# Patient Record
Sex: FEMALE | Race: WHITE | URBAN - METROPOLITAN AREA
[De-identification: names, ages, dates, MRNs, and addresses within clinical notes are randomized per-mention and may not be internally consistent; named-entity substitution may affect disease eponyms.]

---

## 2017-03-03 ENCOUNTER — OUTPATIENT (OUTPATIENT)
Dept: OUTPATIENT SERVICES | Facility: HOSPITAL | Age: 77
LOS: 1 days | End: 2017-03-03

## 2017-03-04 PROBLEM — Z00.00 ENCOUNTER FOR PREVENTIVE HEALTH EXAMINATION: Status: ACTIVE | Noted: 2017-03-04

## 2017-07-27 ENCOUNTER — OUTPATIENT (OUTPATIENT)
Dept: OUTPATIENT SERVICES | Facility: HOSPITAL | Age: 77
LOS: 1 days | End: 2017-07-27

## 2017-09-21 ENCOUNTER — OUTPATIENT (OUTPATIENT)
Dept: OUTPATIENT SERVICES | Facility: HOSPITAL | Age: 77
LOS: 1 days | End: 2017-09-21

## 2018-01-09 ENCOUNTER — OUTPATIENT (OUTPATIENT)
Dept: OUTPATIENT SERVICES | Facility: HOSPITAL | Age: 78
LOS: 1 days | End: 2018-01-09

## 2018-01-24 ENCOUNTER — OUTPATIENT (OUTPATIENT)
Dept: OUTPATIENT SERVICES | Facility: HOSPITAL | Age: 78
LOS: 1 days | End: 2018-01-24

## 2018-05-24 ENCOUNTER — APPOINTMENT (OUTPATIENT)
Dept: CARDIOLOGY | Facility: CLINIC | Age: 78
End: 2018-05-24
Payer: MEDICARE

## 2018-05-24 ENCOUNTER — NON-APPOINTMENT (OUTPATIENT)
Age: 78
End: 2018-05-24

## 2018-05-24 VITALS
BODY MASS INDEX: 20.66 KG/M2 | DIASTOLIC BLOOD PRESSURE: 88 MMHG | HEIGHT: 65 IN | WEIGHT: 124 LBS | SYSTOLIC BLOOD PRESSURE: 160 MMHG | HEART RATE: 73 BPM

## 2018-05-24 DIAGNOSIS — Z83.1 FAMILY HISTORY OF OTHER INFECTIOUS AND PARASITIC DISEASES: ICD-10-CM

## 2018-05-24 DIAGNOSIS — Z84.1 FAMILY HISTORY OF DISORDERS OF KIDNEY AND URETER: ICD-10-CM

## 2018-05-24 DIAGNOSIS — Z87.898 PERSONAL HISTORY OF OTHER SPECIFIED CONDITIONS: ICD-10-CM

## 2018-05-24 DIAGNOSIS — Z83.3 FAMILY HISTORY OF DIABETES MELLITUS: ICD-10-CM

## 2018-05-24 PROCEDURE — 99214 OFFICE O/P EST MOD 30 MIN: CPT

## 2018-05-24 PROCEDURE — 93000 ELECTROCARDIOGRAM COMPLETE: CPT

## 2018-06-14 ENCOUNTER — APPOINTMENT (OUTPATIENT)
Dept: CARDIOLOGY | Facility: CLINIC | Age: 78
End: 2018-06-14
Payer: MEDICARE

## 2018-06-14 PROCEDURE — A9502: CPT

## 2018-06-14 PROCEDURE — 93015 CV STRESS TEST SUPVJ I&R: CPT

## 2018-06-14 PROCEDURE — 93306 TTE W/DOPPLER COMPLETE: CPT

## 2018-06-14 PROCEDURE — 78452 HT MUSCLE IMAGE SPECT MULT: CPT

## 2018-06-21 ENCOUNTER — APPOINTMENT (OUTPATIENT)
Dept: CARDIOLOGY | Facility: CLINIC | Age: 78
End: 2018-06-21
Payer: MEDICARE

## 2018-06-21 VITALS
DIASTOLIC BLOOD PRESSURE: 76 MMHG | SYSTOLIC BLOOD PRESSURE: 124 MMHG | WEIGHT: 124 LBS | HEART RATE: 70 BPM | HEIGHT: 65 IN | BODY MASS INDEX: 20.66 KG/M2

## 2018-06-21 PROCEDURE — 99214 OFFICE O/P EST MOD 30 MIN: CPT

## 2018-06-28 ENCOUNTER — OUTPATIENT (OUTPATIENT)
Dept: OUTPATIENT SERVICES | Facility: HOSPITAL | Age: 78
LOS: 1 days | End: 2018-06-28

## 2018-07-23 ENCOUNTER — APPOINTMENT (OUTPATIENT)
Dept: CARDIOLOGY | Facility: CLINIC | Age: 78
End: 2018-07-23

## 2018-08-03 ENCOUNTER — APPOINTMENT (OUTPATIENT)
Dept: CARDIOLOGY | Facility: CLINIC | Age: 78
End: 2018-08-03
Payer: MEDICARE

## 2018-08-03 VITALS
BODY MASS INDEX: 20.83 KG/M2 | SYSTOLIC BLOOD PRESSURE: 122 MMHG | DIASTOLIC BLOOD PRESSURE: 80 MMHG | WEIGHT: 125 LBS | HEART RATE: 70 BPM | HEIGHT: 65 IN

## 2018-08-03 PROCEDURE — 99213 OFFICE O/P EST LOW 20 MIN: CPT

## 2018-08-03 RX ORDER — DOXYCYCLINE HYCLATE 100 MG/1
100 TABLET ORAL
Qty: 20 | Refills: 0 | Status: DISCONTINUED | COMMUNITY
Start: 2018-05-30 | End: 2018-08-03

## 2018-11-09 ENCOUNTER — APPOINTMENT (OUTPATIENT)
Dept: CARDIOLOGY | Facility: CLINIC | Age: 78
End: 2018-11-09
Payer: MEDICARE

## 2018-11-09 VITALS
HEIGHT: 65 IN | WEIGHT: 124 LBS | DIASTOLIC BLOOD PRESSURE: 74 MMHG | HEART RATE: 66 BPM | BODY MASS INDEX: 20.66 KG/M2 | SYSTOLIC BLOOD PRESSURE: 130 MMHG

## 2018-11-09 DIAGNOSIS — I43 CARDIOMYOPATHY IN DISEASES CLASSIFIED ELSEWHERE: ICD-10-CM

## 2018-11-09 PROCEDURE — 99213 OFFICE O/P EST LOW 20 MIN: CPT

## 2019-03-06 ENCOUNTER — RX RENEWAL (OUTPATIENT)
Age: 79
End: 2019-03-06

## 2019-03-26 ENCOUNTER — OUTPATIENT (OUTPATIENT)
Dept: OUTPATIENT SERVICES | Facility: HOSPITAL | Age: 79
LOS: 1 days | End: 2019-03-26

## 2019-04-17 ENCOUNTER — OUTPATIENT (OUTPATIENT)
Dept: OUTPATIENT SERVICES | Facility: HOSPITAL | Age: 79
LOS: 1 days | End: 2019-04-17

## 2019-05-10 ENCOUNTER — APPOINTMENT (OUTPATIENT)
Dept: CARDIOLOGY | Facility: CLINIC | Age: 79
End: 2019-05-10
Payer: MEDICARE

## 2019-05-10 ENCOUNTER — NON-APPOINTMENT (OUTPATIENT)
Age: 79
End: 2019-05-10

## 2019-05-10 VITALS
BODY MASS INDEX: 19.49 KG/M2 | WEIGHT: 117 LBS | DIASTOLIC BLOOD PRESSURE: 70 MMHG | HEART RATE: 64 BPM | HEIGHT: 65 IN | SYSTOLIC BLOOD PRESSURE: 120 MMHG

## 2019-05-10 PROCEDURE — 93000 ELECTROCARDIOGRAM COMPLETE: CPT

## 2019-05-10 PROCEDURE — 99214 OFFICE O/P EST MOD 30 MIN: CPT

## 2019-05-10 NOTE — PHYSICAL EXAM
[General Appearance - Well Developed] : well developed [Normal Appearance] : normal appearance [Well Groomed] : well groomed [General Appearance - Well Nourished] : well nourished [No Deformities] : no deformities [General Appearance - In No Acute Distress] : no acute distress [Normal Conjunctiva] : the conjunctiva exhibited no abnormalities [No Oral Pallor] : no oral pallor [Eyelids - No Xanthelasma] : the eyelids demonstrated no xanthelasmas [No Oral Cyanosis] : no oral cyanosis [No Jugular Venous Plaza A Waves] : no jugular venous plaza A waves [Respiration, Rhythm And Depth] : normal respiratory rhythm and effort [Auscultation Breath Sounds / Voice Sounds] : lungs were clear to auscultation bilaterally [Exaggerated Use Of Accessory Muscles For Inspiration] : no accessory muscle use [Heart Sounds] : normal S1 and S2 [Heart Rate And Rhythm] : heart rate and rhythm were normal [Edema] : no peripheral edema present [Murmurs] : no murmurs present [Abdomen Mass (___ Cm)] : no abdominal mass palpated [Abdomen Tenderness] : non-tender [Abdomen Soft] : soft [Gait - Sufficient For Exercise Testing] : the gait was sufficient for exercise testing [Abnormal Walk] : normal gait [] : no rash [No Venous Stasis] : no venous stasis [Skin Color & Pigmentation] : normal skin color and pigmentation [No Skin Ulcers] : no skin ulcer [Skin Lesions] : no skin lesions [No Xanthoma] : no  xanthoma was observed [Oriented To Time, Place, And Person] : oriented to person, place, and time [Affect] : the affect was normal [Mood] : the mood was normal [No Anxiety] : not feeling anxious

## 2019-05-10 NOTE — ASSESSMENT
[FreeTextEntry1] : MARAL GARCIA is a 79 year old F who presents today Nov 09, 2018 with the above history and the following active issues:  \par \par Abnormal EKG, with LVH and NSST's.  Echocardiogram revealed normal LV function and RV enlargement, no evidence of LVH, borderline PHTN. Nuclear stress testing was overall negative for ischemia, in presence of equivocal EKG changes. She denies any exertional chest pain or shortness of breath, for now continue to observe and 6 months follow up. Should she have change in symptoms to contact us.\par \par She has prior history of Takotsubo cardiomyopathy, which has resolved, after sudden death of her . Echo reveals EF 60% in June '18.\par \par HTN. Now controlled after starting carvedilol 3.125 BID, and stopping cortisol related supplements and caffeine. Advised low sodium diet and regular cardiovascular exercise. Advised to seek medical advice prior to starting new homeopathic medications. Continue carvedilol at current dose. Educated patient on low salt diet, regular cardiovascular exercise, and weight maintenance for continued BP control. Continue to monitor BP at home and call for persistently elevated readings (>160/90). \par \par Acid reflux, has improved. Educated on refraining from acidic foods and remaining upright for atleast 30 mins after meals. Take OTC zantac PRN reflux symptoms. See primary care for further management if it recurs.\par \par History of CLL in remission, completed Rituxan followed by Dr. Adams.\par \par History of DVT during pregnancy. No recurrence.\par \par F/U with our office in 6 months for routine cardiovascular care unless otherwise indicated. \par Discussed red flag symptoms, which would warrant more urgent medical evaluation.\par Any questions and concerns were addressed and resolved.\par \par

## 2019-05-10 NOTE — REASON FOR VISIT
[Medication Management] : Medication management [Hypertension] : hypertension [Follow-Up - Clinic] : a clinic follow-up of

## 2019-05-10 NOTE — HISTORY OF PRESENT ILLNESS
[FreeTextEntry1] : I had the pleasure of seeing Destiny Horta in cardiology follow up. \par \par Destiny is a 79-year-old female with a history of Takotsubo cardiomyopathy after sudden death of her  in March 2012, currently normalized LVEF, CLL in remission, completed Rituxan chemotherapy followed by Dr. Adams, DVT during pregnancy, and GERD.\par \par Previously there were elevations in her blood pressure at her primary care physician's office. \par \par She was previously on a supplement from an alternative medicine physician which may contain cortisol. \par \par She denies exertional chest pain or shortness of breath, palpitations, syncope, near syncope, PND, orthopnea, jaw or arm pain, or recurrent reflux or HB.\par \par She is active and has no complaints currently. \par \par She has decreased her caffeine intake and denies significant alcohol intake or tobacco use.\par \par Her EKG 5/24/18 revealed normal sinus rhythm with LVH with strain and NSST.\par \par Echocardiogram dated June 14, 2018 reveals EF of 60%, mild mitral regurgitation, right ventricular enlargement with normal right ventricular systolic function, mild TR, borderline pulmonary hypertension with an RVSP of 39 mm of mercury. No evidence of LVH.\par \par Nuclear stress testing June 14, 2018 revealed equivocal EKG changes, a small mild defect of the anterior wall that is fixed and consistent with breast attenuation artifact and otherwise negative for ischemia. No symptoms were reported with exercise.\par

## 2019-09-03 ENCOUNTER — OUTPATIENT (OUTPATIENT)
Dept: OUTPATIENT SERVICES | Facility: HOSPITAL | Age: 79
LOS: 1 days | End: 2019-09-03

## 2019-11-15 ENCOUNTER — OUTPATIENT (OUTPATIENT)
Dept: OUTPATIENT SERVICES | Facility: HOSPITAL | Age: 79
LOS: 1 days | End: 2019-11-15

## 2019-12-17 ENCOUNTER — APPOINTMENT (OUTPATIENT)
Dept: CARDIOLOGY | Facility: CLINIC | Age: 79
End: 2019-12-17
Payer: MEDICARE

## 2019-12-17 VITALS
HEIGHT: 65 IN | BODY MASS INDEX: 19.99 KG/M2 | WEIGHT: 120 LBS | OXYGEN SATURATION: 98 % | HEART RATE: 68 BPM | SYSTOLIC BLOOD PRESSURE: 136 MMHG | DIASTOLIC BLOOD PRESSURE: 70 MMHG

## 2019-12-17 PROCEDURE — 99214 OFFICE O/P EST MOD 30 MIN: CPT

## 2019-12-17 NOTE — HISTORY OF PRESENT ILLNESS
[FreeTextEntry1] : I had the pleasure of seeing Destiny Horta in cardiology follow up. \par \par Destiny is a very pleasant 79-year-old female with a history of Takotsubo cardiomyopathy after sudden death of her  in March 2012, currently normalized LVEF, CLL with current neutropenia, completed Rituxan chemotherapy followed by Dr. Adams, DVT during pregnancy, and GERD.\par \par Previously there were elevations in her blood pressure at her primary care physician's office.   Started on carvedilol 3.125mg BID.  She has been taking it sporadically and brings in her readings which reveal that her pressure is well controlled on it, but elevates it when she holds.  \par \par She denies exertional chest pain or shortness of breath, palpitations, syncope, near syncope, PND, orthopnea, jaw or arm pain, or recurrent reflux or HB.\par \par She is active and has no complaints currently. \par \par She has decreased her caffeine intake and denies significant alcohol intake or tobacco use.\par \par Echocardiogram dated June 14, 2018 reveals EF of 60%, mild mitral regurgitation, right ventricular enlargement with normal right ventricular systolic function, mild TR, borderline pulmonary hypertension with an RVSP of 39 mm of mercury. No evidence of LVH.\par \par Nuclear stress testing June 14, 2018 revealed equivocal EKG changes, a small mild defect of the anterior wall that is fixed and consistent with breast attenuation artifact and otherwise negative for ischemia. No symptoms were reported with exercise.\par

## 2019-12-17 NOTE — REASON FOR VISIT
[Follow-Up - Clinic] : a clinic follow-up of [Hypertension] : hypertension [Medication Management] : Medication management [FreeTextEntry2] : Routine 6 month CV evaluation

## 2019-12-17 NOTE — ASSESSMENT
[FreeTextEntry1] : MARAL GARCIA is a 79 year old F who presents today with the above history and the following active issues:  \par \par Abnormal EKG, with LVH and NSST's.  Echocardiogram revealed normal LV function and RV enlargement, no evidence of LVH, borderline PHTN. Nuclear stress testing was overall negative for ischemia, in presence of equivocal EKG changes. She denies any exertional chest pain or shortness of breath, for now continue to observe and 6 months follow up. Should she have change in symptoms to contact us.\par \par She has prior history of Takotsubo cardiomyopathy, which has resolved, after sudden death of her . Echo reveals EF 60% in June '18.  Currently asymptomatic. \par \par HTN. Now controlled after starting carvedilol 3.125 BID, and stopping cortisol related supplements and caffeine. Poor med compliance.  Home readings show overall well controlled pressures when she takes her meds.  Extensive discussion regarding medication compliance.  Pt verbalizes understanding. Advised low sodium diet and regular cardiovascular exercise. Advised to seek medical advice prior to starting new homeopathic medications. Continue carvedilol at current dose.\par \par History of CLL.  Now with neutropenia.  Follow up with Dr. Adams.\par \par History of DVT during pregnancy. No recurrence.\par \par F/U with our office in 6 months for routine cardiovascular care unless otherwise indicated.

## 2019-12-17 NOTE — PHYSICAL EXAM
[Normal Appearance] : normal appearance [General Appearance - Well Developed] : well developed [Well Groomed] : well groomed [No Deformities] : no deformities [General Appearance - Well Nourished] : well nourished [General Appearance - In No Acute Distress] : no acute distress [Normal Conjunctiva] : the conjunctiva exhibited no abnormalities [Eyelids - No Xanthelasma] : the eyelids demonstrated no xanthelasmas [No Oral Pallor] : no oral pallor [No Oral Cyanosis] : no oral cyanosis [No Jugular Venous Plaza A Waves] : no jugular venous plaza A waves [Exaggerated Use Of Accessory Muscles For Inspiration] : no accessory muscle use [Respiration, Rhythm And Depth] : normal respiratory rhythm and effort [Auscultation Breath Sounds / Voice Sounds] : lungs were clear to auscultation bilaterally [Heart Rate And Rhythm] : heart rate and rhythm were normal [Heart Sounds] : normal S1 and S2 [Edema] : no peripheral edema present [Murmurs] : no murmurs present [Abdomen Tenderness] : non-tender [Abdomen Soft] : soft [Abdomen Mass (___ Cm)] : no abdominal mass palpated [Abnormal Walk] : normal gait [Gait - Sufficient For Exercise Testing] : the gait was sufficient for exercise testing [Skin Color & Pigmentation] : normal skin color and pigmentation [] : no rash [Skin Lesions] : no skin lesions [No Venous Stasis] : no venous stasis [No Xanthoma] : no  xanthoma was observed [No Skin Ulcers] : no skin ulcer [Oriented To Time, Place, And Person] : oriented to person, place, and time [Affect] : the affect was normal [Mood] : the mood was normal [No Anxiety] : not feeling anxious

## 2020-06-17 ENCOUNTER — NON-APPOINTMENT (OUTPATIENT)
Age: 80
End: 2020-06-17

## 2020-06-17 ENCOUNTER — APPOINTMENT (OUTPATIENT)
Dept: CARDIOLOGY | Facility: CLINIC | Age: 80
End: 2020-06-17
Payer: MEDICARE

## 2020-06-17 VITALS
HEIGHT: 65 IN | DIASTOLIC BLOOD PRESSURE: 86 MMHG | HEART RATE: 67 BPM | OXYGEN SATURATION: 98 % | BODY MASS INDEX: 19.66 KG/M2 | SYSTOLIC BLOOD PRESSURE: 170 MMHG | WEIGHT: 118 LBS

## 2020-06-17 PROCEDURE — 93000 ELECTROCARDIOGRAM COMPLETE: CPT

## 2020-06-17 PROCEDURE — 99214 OFFICE O/P EST MOD 30 MIN: CPT

## 2020-06-17 NOTE — HISTORY OF PRESENT ILLNESS
[FreeTextEntry1] : Destiny is a very pleasant 80-year-old female with a history of Takotsubo cardiomyopathy after sudden death of her  in March 2012, currently normalized LVEF, CLL with current neutropenia, completed Rituxan chemotherapy followed by Dr. Adams, DVT during pregnancy, and GERD.\par \par Previously there were elevations in her blood pressure at her primary care physician's office.  She was started on carvedilol 3.125mg BID.  She has been taking it sporadically and brings in her readings which reveal that her pressure is well controlled on it, but elevates it when she holds.  Today  her blood pressure is again elevated and she is not taking Coreg\par \par She denies exertional chest pain or shortness of breath, palpitations, syncope, near syncope, PND, orthopnea, jaw or arm pain, or recurrent reflux or HB.\par \par She is active and has no complaints currently. \par \par She has decreased her caffeine intake and denies significant alcohol intake or tobacco use.\par \par Echocardiogram dated June 14, 2018 reveals EF of 60%, mild mitral regurgitation, right ventricular enlargement with normal right ventricular systolic function, mild TR, borderline pulmonary hypertension with an RVSP of 39 mm of mercury. No evidence of LVH.\par \par Nuclear stress testing June 14, 2018 revealed equivocal EKG changes, a small mild defect of the anterior wall that is fixed and consistent with breast attenuation artifact and otherwise negative for ischemia. No symptoms were reported with exercise.\par

## 2020-06-17 NOTE — ASSESSMENT
[FreeTextEntry1] : MARAL GARCIA is a 79 year old F who presents today with the above history and the following active issues:  \par \par Abnormal EKG, with LVH and NSST's.  Echocardiogram in June 2018 revealed normal LV function and RV enlargement, no evidence of LVH, borderline PHTN. Nuclear stress testing in June thousand 18 was overall negative for ischemia, in presence of equivocal EKG changes. She denies any exertional chest pain or shortness of breath, for now continue to observe and 6 months follow up. Should she have change in symptoms to contact us.\par \par She has prior history of Takotsubo cardiomyopathy  after sudden death of her , which has resolved. Currently asymptomatic. \par \par HTN. Now uncontrolled.  Stopped carvedilol 3.125 BID. Poor med compliance.  Home readings show overall well controlled pressures when she takes her meds.  Extensive discussion regarding medication compliance.  Pt verbalizes understanding. Advised low sodium diet and regular cardiovascular exercise.  Will resume Coreg\par \par Hypercholesterolemia.  Calculated 10-year cardiovascular risk is more than 7.5%.  Statins are indicated.  She is concerned about side effects.  Low-dose will be started.  Check LFT, CK and LDL after 1 month.\par \par Advised to seek medical advice prior to starting new homeopathic medications. \par \par History of CLL.  Now with neutropenia.  Follow up with Dr. Adams.\par \par History of DVT during pregnancy. No recurrence.\par \par

## 2020-06-17 NOTE — PHYSICAL EXAM
[General Appearance - Well Developed] : well developed [Normal Appearance] : normal appearance [Well Groomed] : well groomed [General Appearance - Well Nourished] : well nourished [No Deformities] : no deformities [General Appearance - In No Acute Distress] : no acute distress [Eyelids - No Xanthelasma] : the eyelids demonstrated no xanthelasmas [No Oral Pallor] : no oral pallor [Normal Conjunctiva] : the conjunctiva exhibited no abnormalities [No Oral Cyanosis] : no oral cyanosis [No Jugular Venous Plaza A Waves] : no jugular venous plaza A waves [Respiration, Rhythm And Depth] : normal respiratory rhythm and effort [Exaggerated Use Of Accessory Muscles For Inspiration] : no accessory muscle use [Auscultation Breath Sounds / Voice Sounds] : lungs were clear to auscultation bilaterally [Heart Sounds] : normal S1 and S2 [Heart Rate And Rhythm] : heart rate and rhythm were normal [Murmurs] : no murmurs present [Abdomen Soft] : soft [Edema] : no peripheral edema present [Abnormal Walk] : normal gait [Abdomen Tenderness] : non-tender [Abdomen Mass (___ Cm)] : no abdominal mass palpated [Skin Color & Pigmentation] : normal skin color and pigmentation [] : no rash [Gait - Sufficient For Exercise Testing] : the gait was sufficient for exercise testing [No Skin Ulcers] : no skin ulcer [Skin Lesions] : no skin lesions [No Venous Stasis] : no venous stasis [No Xanthoma] : no  xanthoma was observed [Affect] : the affect was normal [Oriented To Time, Place, And Person] : oriented to person, place, and time [No Anxiety] : not feeling anxious [Mood] : the mood was normal

## 2020-06-17 NOTE — DISCUSSION/SUMMARY
[FreeTextEntry1] : Start Coreg\par Start Lipitor.  Check labs after 1 month\par Check echocardiogram.  Follow-up LVH.  Also carotid ultrasound and abdominal aortic ultrasound\par Follow-up after tests\par Her TSH is slightly high and also thyroid peroxidase.  She will follow-up this with Dr. Morrow.\par

## 2020-08-03 ENCOUNTER — APPOINTMENT (OUTPATIENT)
Dept: CARDIOLOGY | Facility: CLINIC | Age: 80
End: 2020-08-03
Payer: MEDICARE

## 2020-08-03 PROCEDURE — 93306 TTE W/DOPPLER COMPLETE: CPT

## 2020-08-03 PROCEDURE — 93979 VASCULAR STUDY: CPT

## 2020-08-03 PROCEDURE — 93880 EXTRACRANIAL BILAT STUDY: CPT

## 2020-08-10 ENCOUNTER — TRANSCRIPTION ENCOUNTER (OUTPATIENT)
Age: 80
End: 2020-08-10

## 2020-08-10 ENCOUNTER — APPOINTMENT (OUTPATIENT)
Dept: CARDIOLOGY | Facility: CLINIC | Age: 80
End: 2020-08-10
Payer: MEDICARE

## 2020-08-10 VITALS
OXYGEN SATURATION: 98 % | HEART RATE: 71 BPM | DIASTOLIC BLOOD PRESSURE: 80 MMHG | SYSTOLIC BLOOD PRESSURE: 180 MMHG | HEIGHT: 65 IN | BODY MASS INDEX: 19.99 KG/M2 | WEIGHT: 120 LBS

## 2020-08-10 DIAGNOSIS — I51.7 CARDIOMEGALY: ICD-10-CM

## 2020-08-10 PROCEDURE — 99214 OFFICE O/P EST MOD 30 MIN: CPT

## 2020-08-10 NOTE — HISTORY OF PRESENT ILLNESS
[FreeTextEntry1] : Destiny is a very pleasant 80-year-old female with a history of Takotsubo cardiomyopathy after sudden death of her  in March 2012, currently normalized LVEF, CLL with current neutropenia, completed Rituxan chemotherapy followed by Dr. Adams, DVT during pregnancy, and GERD.\par \par Previously there were elevations in her blood pressure at her primary care physician's office.  She was started on carvedilol 3.125mg BID.  Now takes this regularly but her blood pressure is still high today.  She is asymptomatic from it.  \par \par She denies exertional chest pain or shortness of breath, palpitations, syncope, near syncope, PND, orthopnea, jaw or arm pain, or recurrent reflux or HB.\par \par She is active and has no complaints currently. \par \par She has decreased her caffeine intake and denies significant alcohol intake or tobacco use.\par \par Echocardiogram dated June 14, 2018 reveals EF of 60%, right ventricular enlargement with normal right ventricular systolic function, mild TR, borderline pulmonary hypertension with an RVSP of 39 mm of mercury. No evidence of LVH.  Echocardiogram August 2020 shows normal EF, normal PASP normal chamber sizes\par \par Nuclear stress testing June 14, 2018 revealed equivocal EKG changes, a small mild defect of the anterior wall that is fixed and consistent with breast attenuation artifact and otherwise negative for ischemia. No symptoms were reported with exercise.\par \par Her abdominal aortic ultrasound was unremarkable in August 2020 and the carotid ultrasound in August 2020 showed atherosclerosis without stenosis.  There was left ECA narrowing

## 2020-08-10 NOTE — PHYSICAL EXAM
[General Appearance - Well Developed] : well developed [Normal Appearance] : normal appearance [Well Groomed] : well groomed [General Appearance - Well Nourished] : well nourished [No Deformities] : no deformities [General Appearance - In No Acute Distress] : no acute distress [Normal Conjunctiva] : the conjunctiva exhibited no abnormalities [Eyelids - No Xanthelasma] : the eyelids demonstrated no xanthelasmas [No Oral Pallor] : no oral pallor [No Oral Cyanosis] : no oral cyanosis [No Jugular Venous Plaza A Waves] : no jugular venous plaza A waves [Respiration, Rhythm And Depth] : normal respiratory rhythm and effort [Exaggerated Use Of Accessory Muscles For Inspiration] : no accessory muscle use [Auscultation Breath Sounds / Voice Sounds] : lungs were clear to auscultation bilaterally [Heart Rate And Rhythm] : heart rate and rhythm were normal [Heart Sounds] : normal S1 and S2 [Murmurs] : no murmurs present [Edema] : no peripheral edema present [Abdomen Soft] : soft [Abdomen Tenderness] : non-tender [Abnormal Walk] : normal gait [Gait - Sufficient For Exercise Testing] : the gait was sufficient for exercise testing [Abdomen Mass (___ Cm)] : no abdominal mass palpated [Skin Color & Pigmentation] : normal skin color and pigmentation [] : no rash [Skin Lesions] : no skin lesions [No Venous Stasis] : no venous stasis [No Xanthoma] : no  xanthoma was observed [No Skin Ulcers] : no skin ulcer [Oriented To Time, Place, And Person] : oriented to person, place, and time [Mood] : the mood was normal [Affect] : the affect was normal [No Anxiety] : not feeling anxious

## 2021-01-04 ENCOUNTER — APPOINTMENT (OUTPATIENT)
Dept: RADIOLOGY | Facility: CLINIC | Age: 81
End: 2021-01-04
Payer: MEDICARE

## 2021-01-04 PROCEDURE — 72110 X-RAY EXAM L-2 SPINE 4/>VWS: CPT

## 2021-01-06 ENCOUNTER — APPOINTMENT (OUTPATIENT)
Dept: MRI IMAGING | Facility: CLINIC | Age: 81
End: 2021-01-06
Payer: MEDICARE

## 2021-01-06 PROCEDURE — 72148 MRI LUMBAR SPINE W/O DYE: CPT | Mod: MH

## 2021-03-11 ENCOUNTER — APPOINTMENT (OUTPATIENT)
Dept: CARDIOLOGY | Facility: CLINIC | Age: 81
End: 2021-03-11
Payer: MEDICARE

## 2021-03-11 VITALS
BODY MASS INDEX: 19.33 KG/M2 | HEIGHT: 65 IN | OXYGEN SATURATION: 99 % | TEMPERATURE: 97.5 F | SYSTOLIC BLOOD PRESSURE: 156 MMHG | WEIGHT: 116 LBS | DIASTOLIC BLOOD PRESSURE: 80 MMHG | HEART RATE: 60 BPM

## 2021-03-11 PROCEDURE — 99214 OFFICE O/P EST MOD 30 MIN: CPT

## 2021-03-11 RX ORDER — ATORVASTATIN CALCIUM 10 MG/1
10 TABLET, FILM COATED ORAL DAILY
Qty: 90 | Refills: 1 | Status: DISCONTINUED | COMMUNITY
Start: 2020-06-17 | End: 2021-03-11

## 2021-03-11 RX ORDER — NEBIVOLOL HYDROCHLORIDE 5 MG/1
5 TABLET ORAL DAILY
Qty: 90 | Refills: 3 | Status: DISCONTINUED | COMMUNITY
End: 2021-03-11

## 2021-03-11 NOTE — ASSESSMENT
[FreeTextEntry1] : MARAL GARCIA is a 81 year old F who presents today with the above history and the following active issues:  \par \par Abnormal EKG, with LVH and NSST's.  Shortness of breath on exertion.  Atypical epigastric discomfort.  It should be evaluated with stress echocardiogram.  She is able to exercise but has baseline abnormal EKG.\par \par She has prior history of Takotsubo cardiomyopathy  after sudden death of her , which has resolved. Currently asymptomatic. \par \par HTN. uncontrolled on Byastolic.  She was unable to afford Byastolic.  Change Coreg to 3.125 mg 3 times daily.  Keep blood pressure log.  Home blood pressure log.  Blood pressures are better controlled at home.  I have not checked her blood pressure instrument and correlated.\par \par Hypercholesterolemia.  Calculated 10-year cardiovascular risk is more than 7.5%.  Statins were started. \par \par History of CLL.  Now with neutropenia.  Follow up with Dr. Adams.\par \par History of DVT during pregnancy. No recurrence.\par \par Thank you for this referral and allowing me to participate in the care of this patient.  If I can be of any further help or  if you have any questions, please do not hesitate to contact me\par \par \par Sincerely,\par \par Terrell Christensen MD, FAC, [email or web address]

## 2021-03-11 NOTE — HISTORY OF PRESENT ILLNESS
[FreeTextEntry1] : Destiny is a very pleasant 81-year-old female with a history of Takotsubo cardiomyopathy after sudden death of her  in March 2012, currently normalized LVEF, CLL with current neutropenia, completed Rituxan chemotherapy followed by Dr. Adams, DVT during pregnancy, and GERD.\par \par History of labile hypertension.  She was started on carvedilol 3.125mg BID.  This was changed to Byastolic but she is unable to afford it.  She wants to go back on Coreg.  Blood pressure is again elevated in the office today.  Home blood pressure log shows better control.\par \par She denies exertional chest pain.  He has mild shortness of breath.  Denies palpitations, syncope, near syncope, PND, orthopnea, jaw or arm pain.  Patient with epigastric discomfort is difficult to differentiate from GERD.\par \par She is active and has no complaints currently. \par \par She has decreased her caffeine intake and denies significant alcohol intake or tobacco use.\par \par Echocardiogram August 2020 reveals EF of 60%,  RVSP of 31 mm of mercury. No evidence of LVH.  \par \par Nuclear stress testing June 14, 2018 revealed equivocal EKG changes, a small mild defect of the anterior wall that is fixed and consistent with breast attenuation artifact and otherwise negative for ischemia. No symptoms were reported with exercise.\par \par Her abdominal aortic ultrasound was unremarkable in August 2020 and the carotid ultrasound in August 2020 showed atherosclerosis without stenosis.  There was left ECA narrowing

## 2021-03-11 NOTE — PHYSICAL EXAM
[General Appearance - Well Developed] : well developed [Normal Appearance] : normal appearance [Well Groomed] : well groomed [General Appearance - Well Nourished] : well nourished [No Deformities] : no deformities [General Appearance - In No Acute Distress] : no acute distress [Normal Conjunctiva] : the conjunctiva exhibited no abnormalities [Eyelids - No Xanthelasma] : the eyelids demonstrated no xanthelasmas [No Oral Pallor] : no oral pallor [No Oral Cyanosis] : no oral cyanosis [No Jugular Venous Plaza A Waves] : no jugular venous plaza A waves [Respiration, Rhythm And Depth] : normal respiratory rhythm and effort [Exaggerated Use Of Accessory Muscles For Inspiration] : no accessory muscle use [Auscultation Breath Sounds / Voice Sounds] : lungs were clear to auscultation bilaterally [Heart Rate And Rhythm] : heart rate and rhythm were normal [Heart Sounds] : normal S1 and S2 [Murmurs] : no murmurs present [Edema] : no peripheral edema present [Abdomen Soft] : soft [Abdomen Tenderness] : non-tender [Abdomen Mass (___ Cm)] : no abdominal mass palpated [Abnormal Walk] : normal gait [Gait - Sufficient For Exercise Testing] : the gait was sufficient for exercise testing [Nail Clubbing] : no clubbing of the fingernails [Cyanosis, Localized] : no localized cyanosis [Skin Color & Pigmentation] : normal skin color and pigmentation [] : no rash [No Venous Stasis] : no venous stasis [Skin Lesions] : no skin lesions [No Skin Ulcers] : no skin ulcer [No Xanthoma] : no  xanthoma was observed [Oriented To Time, Place, And Person] : oriented to person, place, and time [Affect] : the affect was normal [Mood] : the mood was normal [No Anxiety] : not feeling anxious

## 2021-04-22 NOTE — CC
Cerebrovascular accident (CVA) due to embolism of left middle cerebral artery [DrCarmelita  ___] : Dr. BERMAN

## 2021-05-09 ENCOUNTER — RESULT CHARGE (OUTPATIENT)
Age: 81
End: 2021-05-09

## 2021-05-10 ENCOUNTER — NON-APPOINTMENT (OUTPATIENT)
Age: 81
End: 2021-05-10

## 2021-05-10 ENCOUNTER — APPOINTMENT (OUTPATIENT)
Dept: CARDIOLOGY | Facility: CLINIC | Age: 81
End: 2021-05-10
Payer: MEDICARE

## 2021-05-10 VITALS
BODY MASS INDEX: 18.99 KG/M2 | HEIGHT: 65 IN | DIASTOLIC BLOOD PRESSURE: 86 MMHG | TEMPERATURE: 91 F | HEART RATE: 66 BPM | OXYGEN SATURATION: 98 % | WEIGHT: 114 LBS | SYSTOLIC BLOOD PRESSURE: 180 MMHG

## 2021-05-10 PROCEDURE — 93000 ELECTROCARDIOGRAM COMPLETE: CPT

## 2021-05-10 PROCEDURE — 99214 OFFICE O/P EST MOD 30 MIN: CPT

## 2021-05-10 NOTE — PHYSICAL EXAM
[General Appearance - Well Developed] : well developed [Normal Appearance] : normal appearance [Well Groomed] : well groomed [General Appearance - Well Nourished] : well nourished [No Deformities] : no deformities [General Appearance - In No Acute Distress] : no acute distress [Eyelids - No Xanthelasma] : the eyelids demonstrated no xanthelasmas [No Oral Pallor] : no oral pallor [No Oral Cyanosis] : no oral cyanosis [No Jugular Venous Plaza A Waves] : no jugular venous plaza A waves [Respiration, Rhythm And Depth] : normal respiratory rhythm and effort [Exaggerated Use Of Accessory Muscles For Inspiration] : no accessory muscle use [Auscultation Breath Sounds / Voice Sounds] : lungs were clear to auscultation bilaterally [Heart Rate And Rhythm] : heart rate and rhythm were normal [Heart Sounds] : normal S1 and S2 [Murmurs] : no murmurs present [Edema] : no peripheral edema present [Abdomen Soft] : soft [Abdomen Tenderness] : non-tender [Abdomen Mass (___ Cm)] : no abdominal mass palpated [Abnormal Walk] : normal gait [Gait - Sufficient For Exercise Testing] : the gait was sufficient for exercise testing [Nail Clubbing] : no clubbing of the fingernails [Cyanosis, Localized] : no localized cyanosis [Skin Color & Pigmentation] : normal skin color and pigmentation [] : no rash [No Venous Stasis] : no venous stasis [Skin Lesions] : no skin lesions [No Skin Ulcers] : no skin ulcer [No Xanthoma] : no  xanthoma was observed [Oriented To Time, Place, And Person] : oriented to person, place, and time [Affect] : the affect was normal [Mood] : the mood was normal [No Anxiety] : not feeling anxious [Well Developed] : well developed [Well Nourished] : well nourished [No Acute Distress] : no acute distress [Normal Conjunctiva] : normal conjunctiva [Normal Venous Pressure] : normal venous pressure [No Carotid Bruit] : no carotid bruit [Normal S1, S2] : normal S1, S2 [No Murmur] : no murmur [No Rub] : no rub [No Gallop] : no gallop [Clear Lung Fields] : clear lung fields [Good Air Entry] : good air entry [No Respiratory Distress] : no respiratory distress  [Soft] : abdomen soft [Non Tender] : non-tender [No Masses/organomegaly] : no masses/organomegaly [Normal Bowel Sounds] : normal bowel sounds [Normal Gait] : normal gait [No Edema] : no edema [No Cyanosis] : no cyanosis [No Clubbing] : no clubbing [No Varicosities] : no varicosities [No Rash] : no rash [No Skin Lesions] : no skin lesions [Moves all extremities] : moves all extremities [No Focal Deficits] : no focal deficits [Normal Speech] : normal speech [Alert and Oriented] : alert and oriented [Normal memory] : normal memory

## 2021-05-10 NOTE — HISTORY OF PRESENT ILLNESS
[FreeTextEntry1] : MARAL GARCIA is a 81 year old female with a past medical history of Takotsubo cardiomyopathy after sudden death of her  in March 2012, currently normalized LVEF, labile HTN, CLL with current neutropenia, completed Rituxan chemotherapy followed by Dr. Adams, DVT during pregnancy, and GERD.\par \par Last seen 3/11/21. In the interim there have been no hospitalizations or procedures. Presents today from PT with elevated blood pressures. Complaints of a headache. Denies visual disturbances. She reports an atypical feeling in her chest that "radiates through her whole body". She is aware of her breathing. She denies palpitations, unusual shortness of breath, orthopnea, LE edema, lightheadedness, dizziness, near syncope or syncope. Never a smoker. Exercises regularly without exertional complaints.\par \par /100 in right arm and 188/102 in left arm.\par \par Testing:\par \par EKG 5/10/21: SB at 59 bpm with LVH and nonspecific ST-T wave abnormalities, IL interval 198 ms, QTc 411 ms\par \par Echocardiogram August 2020 reveals EF of 60%,  RVSP of 31 mm of mercury. No evidence of LVH.  \par \par Nuclear stress testing June 14, 2018 revealed equivocal EKG changes, a small mild defect of the anterior wall that is fixed and consistent with breast attenuation artifact and otherwise negative for ischemia. No symptoms were reported with exercise.\par \par Her abdominal aortic ultrasound was unremarkable in August 2020 and the carotid ultrasound in August 2020 showed atherosclerosis without stenosis.  There was left ECA narrowing\par \par labs 1/27/20: K 4.2, Cr 0.72, Ca 10.1, AST 19, ALT 9, Mag 2, Chol 226, HDL 84, , Trigs 97, TSH 5.47, Hgb 14.2, A1C 5.2,

## 2021-05-10 NOTE — ASSESSMENT
[FreeTextEntry1] : MARAL GARCIA is a 81 year old F who presents today May 10, 2021 with the above history and the following active issues:\par \par Symptomatic HTN with an atypical feeling in her chest. ER strongly advised. Patient refuses. Risks explained. Increase Coreg to 6.25mg BID and add Losartan 50mg daily. Fasting b/w in 1 week.\par \par \par Abnormal EKG, with LVH and NSST's.  Shortness of breath on exertion.  Atypical epigastric discomfort. Obtain a nuclear stress test to evaluate for evidence of myocardial ischemia. This will be done when BP normalizes.\par \par She has prior history of Takotsubo cardiomyopathy  after sudden death of her , which has resolved. \par \par Hypercholesterolemia. Utilizing dietary measures. Labs as above.\par \par History of CLL.  Now with neutropenia.  Follow up with Dr. Adams.\par \par History of DVT during pregnancy. No recurrence.\par \par Ongoing f/u with PCP.\par \par F/U after testing to review results (nuke, BP check, and labs).\par Discussed red flag symptoms, which would warrant sooner or emergent medical evaluation.\par Any questions and concerns were addressed and resolved.\par \par Sincerely,\par Kortney Flody FN-BC\par Patient's history, testing, and plan was reviewed with supervising physician, Dr. Kali Lundberg

## 2021-06-14 ENCOUNTER — APPOINTMENT (OUTPATIENT)
Dept: CARDIOLOGY | Facility: CLINIC | Age: 81
End: 2021-06-14

## 2021-06-21 ENCOUNTER — APPOINTMENT (OUTPATIENT)
Dept: CARDIOLOGY | Facility: CLINIC | Age: 81
End: 2021-06-21
Payer: MEDICARE

## 2021-06-21 PROCEDURE — 78452 HT MUSCLE IMAGE SPECT MULT: CPT

## 2021-06-21 PROCEDURE — 93015 CV STRESS TEST SUPVJ I&R: CPT

## 2021-06-21 PROCEDURE — A9502: CPT

## 2021-07-01 ENCOUNTER — APPOINTMENT (OUTPATIENT)
Dept: CARDIOLOGY | Facility: CLINIC | Age: 81
End: 2021-07-01
Payer: MEDICARE

## 2021-07-01 VITALS
DIASTOLIC BLOOD PRESSURE: 62 MMHG | HEART RATE: 61 BPM | HEIGHT: 65 IN | OXYGEN SATURATION: 96 % | SYSTOLIC BLOOD PRESSURE: 146 MMHG | BODY MASS INDEX: 18.33 KG/M2 | TEMPERATURE: 90.5 F | WEIGHT: 110 LBS

## 2021-07-01 PROCEDURE — 99214 OFFICE O/P EST MOD 30 MIN: CPT

## 2021-07-01 NOTE — PHYSICAL EXAM
[Well Developed] : well developed [Well Nourished] : well nourished [No Acute Distress] : no acute distress [Normal Venous Pressure] : normal venous pressure [No Carotid Bruit] : no carotid bruit [Normal S1, S2] : normal S1, S2 [No Murmur] : no murmur [No Rub] : no rub [No Gallop] : no gallop [Clear Lung Fields] : clear lung fields [Good Air Entry] : good air entry [No Respiratory Distress] : no respiratory distress  [Soft] : abdomen soft [Non Tender] : non-tender [No Masses/organomegaly] : no masses/organomegaly [Normal Bowel Sounds] : normal bowel sounds [Normal Gait] : normal gait [No Edema] : no edema [No Cyanosis] : no cyanosis [No Clubbing] : no clubbing [No Varicosities] : no varicosities [No Rash] : no rash [No Skin Lesions] : no skin lesions [Moves all extremities] : moves all extremities [No Focal Deficits] : no focal deficits [Normal Speech] : normal speech [Alert and Oriented] : alert and oriented [Normal memory] : normal memory [General Appearance - Well Developed] : well developed [Normal Appearance] : normal appearance [Well Groomed] : well groomed [General Appearance - Well Nourished] : well nourished [No Deformities] : no deformities [General Appearance - In No Acute Distress] : no acute distress [Normal Conjunctiva] : the conjunctiva exhibited no abnormalities [Eyelids - No Xanthelasma] : the eyelids demonstrated no xanthelasmas [No Jugular Venous Plaza A Waves] : no jugular venous plaza A waves [Respiration, Rhythm And Depth] : normal respiratory rhythm and effort [Exaggerated Use Of Accessory Muscles For Inspiration] : no accessory muscle use [Auscultation Breath Sounds / Voice Sounds] : lungs were clear to auscultation bilaterally [Heart Rate And Rhythm] : heart rate and rhythm were normal [Heart Sounds] : normal S1 and S2 [Murmurs] : no murmurs present [Edema] : no peripheral edema present [Abdomen Soft] : soft [Abdomen Tenderness] : non-tender [Abdomen Mass (___ Cm)] : no abdominal mass palpated [Abnormal Walk] : normal gait [Gait - Sufficient For Exercise Testing] : the gait was sufficient for exercise testing [Nail Clubbing] : no clubbing of the fingernails [Cyanosis, Localized] : no localized cyanosis [Skin Color & Pigmentation] : normal skin color and pigmentation [] : no rash [No Venous Stasis] : no venous stasis [Skin Lesions] : no skin lesions [No Skin Ulcers] : no skin ulcer [No Xanthoma] : no  xanthoma was observed [Oriented To Time, Place, And Person] : oriented to person, place, and time [Affect] : the affect was normal [Mood] : the mood was normal [No Anxiety] : not feeling anxious

## 2021-07-04 NOTE — ASSESSMENT
[FreeTextEntry1] : MARAL GARCIA is a 81 year old F who presents today with the above history and the following active issues:\par \par HTN: Questionable med compliance.  Patient unsure as to what meds she is taking.  She thinks it is a little green pill.  Advised patient to return with home monitor to assess for accuracy.  Nursing will update her meds based on pill bottles that she brings.  I will adjust medications accordingly once this information is available.\par \par Atypical chest pain: No exertional symptoms.  Abnormal nuclear stress test.  Ischemia on EKG and significant gut attenuation with normal prone imaging.  Recommend cardiac CTA for further evaluation.  Red flag symptoms that would warrant emergent evaluation discussed.  Pt verbalizes understanding. \par \par She has prior history of Takotsubo cardiomyopathy  after sudden death of her , which has resolved. \par \par Hypercholesterolemia.  She believes she is taking rosuvastatin 5 mg daily and tolerating it well.  She will bring in her pills to confirm.\par \par History of CLL.  Now with neutropenia.  Follow up with Dr. Adams.\par \par History of DVT during pregnancy. No recurrence.\par \par Ongoing f/u with PCP.\par \par Close clinical follow-up in 3 months.\par \par

## 2021-07-04 NOTE — HISTORY OF PRESENT ILLNESS
[FreeTextEntry1] : MARAL GARCIA is a 81 year old female with a past medical history of Takotsubo cardiomyopathy after sudden death of her  in March 2012, currently normalized LVEF, labile HTN, CLL with current neutropenia, completed Rituxan chemotherapy followed by Dr. Adams, DVT during pregnancy, and GERD.\par \par Presents today to review the results of her nuclear stress test.  She reports an atypical feeling in her chest that "radiates through her whole body". She is aware of her breathing.  Occurs mostly at rest or if she becomes stressed.  Is able to walk along the beach with no exertional symptoms.  She denies palpitations, unusual shortness of breath, orthopnea, LE edema, lightheadedness, dizziness, near syncope or syncope. Never a smoker. \par \par Blood pressure was elevated at last office visit.  Coreg was increased to 6.25 twice daily and losartan 50 mg daily was added.  Patient does not believe that she started the losartan.  There is questionable med compliance with twice daily dosing of Coreg.  Repeat blood pressure my evaluation was 150/70 mmHg.  Blood pressure readings from home vary between 120s and 150s over 70s to 90s at home.\par \par Testing:\par \par 6/21/2021.  Nuclear stress test.  Completed 7 minutes 30 seconds of Demetrius protocol achieving 95% of MPHR.  No chest pain with exercise.  Significant ST segment changes inferolaterally.  Small mild defect in inferior wall that is reversible consistent with ischemia.  Accuracy of the test was decreased with significant gut attenuation and resolution of defect on prone imaging.  EF: 70%.\par \par EKG 5/10/21: SB at 59 bpm with LVH and nonspecific ST-T wave abnormalities, OH interval 198 ms, QTc 411 ms\par \par Echocardiogram August 2020 reveals EF of 60%,  RVSP of 31 mm of mercury. No evidence of LVH.  \par \par Nuclear stress testing June 14, 2018 revealed equivocal EKG changes, a small mild defect of the anterior wall that is fixed and consistent with breast attenuation artifact and otherwise negative for ischemia. No symptoms were reported with exercise.\par \par Her abdominal aortic ultrasound was unremarkable in August 2020 and the carotid ultrasound in August 2020 showed atherosclerosis without stenosis.  There was left ECA narrowing\par \par labs 1/27/20: K 4.2, Cr 0.72, Ca 10.1, AST 19, ALT 9, Mag 2, Chol 226, HDL 84, , Trigs 97, TSH 5.47, Hgb 14.2, A1C 5.2,

## 2021-07-13 ENCOUNTER — NON-APPOINTMENT (OUTPATIENT)
Age: 81
End: 2021-07-13

## 2021-07-28 ENCOUNTER — APPOINTMENT (OUTPATIENT)
Dept: CARDIOLOGY | Facility: CLINIC | Age: 81
End: 2021-07-28
Payer: MEDICARE

## 2021-07-28 VITALS
DIASTOLIC BLOOD PRESSURE: 70 MMHG | HEART RATE: 70 BPM | HEIGHT: 65 IN | BODY MASS INDEX: 18.33 KG/M2 | OXYGEN SATURATION: 97 % | SYSTOLIC BLOOD PRESSURE: 142 MMHG | TEMPERATURE: 97.1 F | WEIGHT: 110 LBS

## 2021-07-28 VITALS — DIASTOLIC BLOOD PRESSURE: 64 MMHG | SYSTOLIC BLOOD PRESSURE: 138 MMHG

## 2021-07-28 PROCEDURE — 99214 OFFICE O/P EST MOD 30 MIN: CPT

## 2021-07-28 NOTE — ASSESSMENT
[FreeTextEntry1] : MARAL GARCIA is a 81 year old F who presents today with the above history and the following active issues:\par \par HTN: Labile.  Element of whitecoat syndrome.  Home blood pressures are very well controlled and borderline hypotensive.  Denies any symptoms of orthostasis.  No change in medications at this time.  Advised to maintain hydration during the hotter summer months.\par \par Atypical chest pain: No exertional symptoms.  Abnormal nuclear stress test.  Ischemia on EKG and significant gut attenuation with normal prone imaging.  Recommend cardiac CTA for further evaluation.  She has not yet gone for the study.  Red flag symptoms that would warrant emergent evaluation discussed.  Pt verbalizes understanding. \par \par She has prior history of Takotsubo cardiomyopathy  after sudden death of her , which has resolved. \par \par Hypercholesterolemia.  Tolerating statin therapy.  Continue same.  If evidence of CAD on CTA, with plan to uptitrate.\par \par History of CLL.  Now with neutropenia.  Follow up with Dr. Adams.\par \par History of DVT during pregnancy. No recurrence.\par \par Ongoing f/u with PCP.\par \par Close clinical follow-up in 3 months.\par \par

## 2021-07-28 NOTE — HISTORY OF PRESENT ILLNESS
[FreeTextEntry1] : MARAL GARCIA is a 81 year old female with a past medical history of Takotsubo cardiomyopathy after sudden death of her  in March 2012, currently normalized LVEF, labile HTN, CLL with current neutropenia, completed Rituxan chemotherapy followed by Dr. Adams, DVT during pregnancy, and GERD.\par \par Presents today for reevaluation of her blood pressure.  Blood pressure medications were verified by nursing last week.  Patient reports compliance.  Blood pressure monitor was found to be 20 points higher systolic and negligible difference with diastolic pressures.  She brings her home readings to review.  Most readings are in the 100s to 1 teens corrected for machine inaccuracy.  Blood pressure on my evaluation was 138/64 mmHg.  Home machine checked again and is still reading 20 points higher systolic. \par \par She is able to walk along the beach with no exertional symptoms.  She denies palpitations, unusual shortness of breath, orthopnea, LE edema, lightheadedness, dizziness, near syncope or syncope. Never a smoker. \par \par Testing:\par \par 6/21/2021.  Nuclear stress test.  Completed 7 minutes 30 seconds of Demetrius protocol achieving 95% of MPHR.  No chest pain with exercise.  Significant ST segment changes inferolaterally.  Small mild defect in inferior wall that is reversible consistent with ischemia.  Accuracy of the test was decreased with significant gut attenuation and resolution of defect on prone imaging.  EF: 70%.\par \par EKG 5/10/21: SB at 59 bpm with LVH and nonspecific ST-T wave abnormalities, CA interval 198 ms, QTc 411 ms\par \par Echocardiogram August 2020 reveals EF of 60%,  RVSP of 31 mm of mercury. No evidence of LVH.  \par \par Nuclear stress testing June 14, 2018 revealed equivocal EKG changes, a small mild defect of the anterior wall that is fixed and consistent with breast attenuation artifact and otherwise negative for ischemia. No symptoms were reported with exercise.\par \par Her abdominal aortic ultrasound was unremarkable in August 2020 and the carotid ultrasound in August 2020 showed atherosclerosis without stenosis.  There was left ECA narrowing\par \par labs 1/27/20: K 4.2, Cr 0.72, Ca 10.1, AST 19, ALT 9, Mag 2, Chol 226, HDL 84, , Trigs 97, TSH 5.47, Hgb 14.2, A1C 5.2,

## 2021-09-15 ENCOUNTER — APPOINTMENT (OUTPATIENT)
Dept: CARDIOLOGY | Facility: CLINIC | Age: 81
End: 2021-09-15

## 2021-10-11 ENCOUNTER — APPOINTMENT (OUTPATIENT)
Dept: RADIOLOGY | Facility: CLINIC | Age: 81
End: 2021-10-11
Payer: MEDICARE

## 2021-10-11 PROCEDURE — 72050 X-RAY EXAM NECK SPINE 4/5VWS: CPT

## 2021-11-01 ENCOUNTER — APPOINTMENT (OUTPATIENT)
Dept: RADIOLOGY | Facility: CLINIC | Age: 81
End: 2021-11-01

## 2022-01-19 ENCOUNTER — OUTPATIENT (OUTPATIENT)
Dept: OUTPATIENT SERVICES | Facility: HOSPITAL | Age: 82
LOS: 1 days | End: 2022-01-19

## 2022-01-27 DIAGNOSIS — N39.0 URINARY TRACT INFECTION, SITE NOT SPECIFIED: ICD-10-CM

## 2022-02-07 ENCOUNTER — OUTPATIENT (OUTPATIENT)
Dept: OUTPATIENT SERVICES | Facility: HOSPITAL | Age: 82
LOS: 1 days | End: 2022-02-07

## 2022-02-07 ENCOUNTER — APPOINTMENT (OUTPATIENT)
Dept: CARDIOLOGY | Facility: CLINIC | Age: 82
End: 2022-02-07

## 2022-02-07 DIAGNOSIS — N39.0 URINARY TRACT INFECTION, SITE NOT SPECIFIED: ICD-10-CM

## 2022-03-29 ENCOUNTER — NON-APPOINTMENT (OUTPATIENT)
Age: 82
End: 2022-03-29

## 2022-03-29 ENCOUNTER — APPOINTMENT (OUTPATIENT)
Dept: CARDIOLOGY | Facility: CLINIC | Age: 82
End: 2022-03-29
Payer: MEDICARE

## 2022-03-29 VITALS
DIASTOLIC BLOOD PRESSURE: 80 MMHG | WEIGHT: 110 LBS | SYSTOLIC BLOOD PRESSURE: 128 MMHG | RESPIRATION RATE: 12 BRPM | BODY MASS INDEX: 18.33 KG/M2 | HEIGHT: 65 IN | OXYGEN SATURATION: 99 % | TEMPERATURE: 96.8 F | HEART RATE: 59 BPM

## 2022-03-29 DIAGNOSIS — R07.89 OTHER CHEST PAIN: ICD-10-CM

## 2022-03-29 DIAGNOSIS — Z85.6 PERSONAL HISTORY OF LEUKEMIA: ICD-10-CM

## 2022-03-29 PROCEDURE — 93000 ELECTROCARDIOGRAM COMPLETE: CPT

## 2022-03-29 PROCEDURE — 99214 OFFICE O/P EST MOD 30 MIN: CPT

## 2022-03-29 RX ORDER — ROSUVASTATIN CALCIUM 5 MG/1
5 TABLET, FILM COATED ORAL DAILY
Qty: 90 | Refills: 2 | Status: DISCONTINUED | COMMUNITY
Start: 2021-05-10 | End: 2022-03-29

## 2022-03-29 NOTE — HISTORY OF PRESENT ILLNESS
[FreeTextEntry1] : MARAL GARCIA is a 82 year old female with a past medical history of Takotsubo cardiomyopathy after sudden death of her  in March 2012, currently normalized LVEF, labile HTN, CLL with current neutropenia, completed Rituxan chemotherapy followed by Dr. Adams, DVT during pregnancy, and GERD.\par \par Hypertension: Blood pressure well controlled today. (History of high blood pressure at home as her machine recorded falsely high readings)\par \par She is able to go up to flight of stairs without chest pain or shortness of breath.   She denies palpitations, unusual shortness of breath, orthopnea, LE edema, lightheadedness, dizziness, near syncope or syncope. Never a smoker. \par \par Previous testing:\par \par 6/21/2021.  Nuclear stress test.  Completed 7 minutes 30 seconds of Demetrius protocol achieving 95% of MPHR.  No chest pain with exercise.  Significant ST segment changes inferolaterally.  Small mild defect in inferior wall that is reversible consistent with ischemia.  Accuracy of the test was decreased with significant gut attenuation and resolution of defect on prone imaging.  EF: 70%.\par \par EKG 5/10/21: SB at 59 bpm with LVH and nonspecific ST-T wave abnormalities, ID interval 198 ms, QTc 411 ms\par \par Echocardiogram August 2020 reveals EF of 60%,  RVSP of 31 mm of mercury. No evidence of LVH.  \par \par Nuclear stress testing June 14, 2018 revealed equivocal EKG changes, a small mild defect of the anterior wall that is fixed and consistent with breast attenuation artifact and otherwise negative for ischemia. No symptoms were reported with exercise.\par \par Her abdominal aortic ultrasound was unremarkable in August 2020 and the carotid ultrasound in August 2020 showed atherosclerosis without stenosis.  There was left ECA narrowing\par \par labs 1/27/20: K 4.2, Cr 0.72, Ca 10.1, AST 19, ALT 9, Mag 2, Chol 226, HDL 84, , Trigs 97, TSH 5.47, Hgb 14.2, A1C 5.2,

## 2022-03-29 NOTE — ASSESSMENT
[FreeTextEntry1] : MARAL GARCIA is a 81 year old F who presents today with the above history and the following active issues:\par \par HTN: Labile.  Element of whitecoat syndrome.  Today, blood pressures are very well controlled;  Denies any symptoms of orthostasis.  No change in medications at this time.  Advised to maintain hydration during the hotter summer months.\par \par CAD: Mild abnormality in inferior wall perfusion on stress test June 2021.  She did not pursue subsequent CTA of coronaries as recommended.  The patient has been asymptomatic with fairly active lifestyle for her age since then on current medications.  At this time, unless the patient has symptoms of CAD, pursuing CTA of coronaries is questionable.\par \par She has prior history of Takotsubo cardiomyopathy  after sudden death of her , which has resolved. \par \par Hypercholesterolemia.  She has stopped taking Crestor.  LDL went up to 129.  I have asked her to resume Crestor.  Patient understands the risks of progressive CAD.  \par \par History of CLL.  Now with normalized WBC .  Follows  Dr. Adams.\par \par History of DVT during pregnancy. No recurrence.\par \par Ongoing f/u with PCP.\par \par PREOP:\par The patient is scheduled to undergo left eye lacrimal duct surgery under general anesthesia.  The patient should be low risk for major cardiovascular events.  She should continue her losartan and Coreg without interruption.  The patient is not taking any antiplatelet or anticoagulation at this time.  \par \par EKG in the office today shows: Sinus rhythm.  LVH.  No ST abnormality.  Unchanged compared to previous EKG.\par \par Thank you for this referral and allowing me to participate in the care of this patient.  If I can be of any further help or  if you have any questions, please do not hesitate to contact me\par \par \par Sincerely,\par \par Terrell Christensen MD, FACC, GUILLERMO

## 2022-03-29 NOTE — PHYSICAL EXAM
[General Appearance - Well Developed] : well developed [Normal Appearance] : normal appearance [Well Groomed] : well groomed [General Appearance - Well Nourished] : well nourished [No Deformities] : no deformities [General Appearance - In No Acute Distress] : no acute distress [Normal Conjunctiva] : the conjunctiva exhibited no abnormalities [Eyelids - No Xanthelasma] : the eyelids demonstrated no xanthelasmas [No Jugular Venous Plaza A Waves] : no jugular venous plaza A waves [Respiration, Rhythm And Depth] : normal respiratory rhythm and effort [Exaggerated Use Of Accessory Muscles For Inspiration] : no accessory muscle use [Auscultation Breath Sounds / Voice Sounds] : lungs were clear to auscultation bilaterally [Heart Rate And Rhythm] : heart rate and rhythm were normal [Heart Sounds] : normal S1 and S2 [Murmurs] : no murmurs present [Edema] : no peripheral edema present [Abdomen Soft] : soft [Abdomen Tenderness] : non-tender [Abdomen Mass (___ Cm)] : no abdominal mass palpated [Abnormal Walk] : normal gait [Gait - Sufficient For Exercise Testing] : the gait was sufficient for exercise testing [Nail Clubbing] : no clubbing of the fingernails [Cyanosis, Localized] : no localized cyanosis [Skin Color & Pigmentation] : normal skin color and pigmentation [] : no rash [No Venous Stasis] : no venous stasis [Skin Lesions] : no skin lesions [No Skin Ulcers] : no skin ulcer [No Xanthoma] : no  xanthoma was observed [Oriented To Time, Place, And Person] : oriented to person, place, and time [Affect] : the affect was normal [Mood] : the mood was normal [No Anxiety] : not feeling anxious

## 2022-06-13 ENCOUNTER — OUTPATIENT (OUTPATIENT)
Dept: OUTPATIENT SERVICES | Facility: HOSPITAL | Age: 82
LOS: 1 days | End: 2022-06-13

## 2022-06-13 DIAGNOSIS — N39.0 URINARY TRACT INFECTION, SITE NOT SPECIFIED: ICD-10-CM

## 2022-10-10 ENCOUNTER — APPOINTMENT (OUTPATIENT)
Dept: CARDIOLOGY | Facility: CLINIC | Age: 82
End: 2022-10-10

## 2022-10-10 VITALS — SYSTOLIC BLOOD PRESSURE: 180 MMHG | DIASTOLIC BLOOD PRESSURE: 94 MMHG

## 2022-10-10 VITALS
TEMPERATURE: 96.8 F | HEART RATE: 73 BPM | BODY MASS INDEX: 18.99 KG/M2 | WEIGHT: 114 LBS | DIASTOLIC BLOOD PRESSURE: 94 MMHG | RESPIRATION RATE: 12 BRPM | HEIGHT: 65 IN | OXYGEN SATURATION: 100 % | SYSTOLIC BLOOD PRESSURE: 180 MMHG

## 2022-10-10 DIAGNOSIS — R94.31 ABNORMAL ELECTROCARDIOGRAM [ECG] [EKG]: ICD-10-CM

## 2022-10-10 DIAGNOSIS — R94.39 ABNORMAL RESULT OF OTHER CARDIOVASCULAR FUNCTION STUDY: ICD-10-CM

## 2022-10-10 DIAGNOSIS — E78.5 HYPERLIPIDEMIA, UNSPECIFIED: ICD-10-CM

## 2022-10-10 DIAGNOSIS — K21.9 GASTRO-ESOPHAGEAL REFLUX DISEASE W/OUT ESOPHAGITIS: ICD-10-CM

## 2022-10-10 DIAGNOSIS — M81.0 AGE-RELATED OSTEOPOROSIS W/OUT CURRENT PATHOLOGICAL FRACTURE: ICD-10-CM

## 2022-10-10 PROCEDURE — 99214 OFFICE O/P EST MOD 30 MIN: CPT

## 2022-10-10 RX ORDER — LOSARTAN POTASSIUM 50 MG/1
50 TABLET, FILM COATED ORAL DAILY
Qty: 90 | Refills: 1 | Status: DISCONTINUED | COMMUNITY
Start: 2021-05-10 | End: 2022-10-10

## 2022-10-10 RX ORDER — FLUTICASONE PROPIONATE 50 UG/1
50 SPRAY, METERED NASAL DAILY
Refills: 0 | Status: DISCONTINUED | COMMUNITY
Start: 2019-04-04 | End: 2022-10-10

## 2022-10-10 NOTE — HISTORY OF PRESENT ILLNESS
[FreeTextEntry1] : MARAL GARCIA is a 82 year old female with a past medical history of Takotsubo cardiomyopathy after sudden death of her  in March 2012, currently normalized LVEF, labile HTN, CLL with current neutropenia, completed Rituxan chemotherapy followed by Dr. Adams, DVT during pregnancy, and GERD.\par \par Hypertension: Blood pressure not controlled today. (History of high blood pressure at home as her machine recorded falsely high readings).  Stopped all her medications.  She follows her blood pressure with Dr. Torres and claims that it has always been well controlled excepting in stressful situations\par \par She is able to go up to flight of stairs without chest pain or shortness of breath.   She denies palpitations, unusual shortness of breath, orthopnea, LE edema, lightheadedness, dizziness, near syncope or syncope. Never a smoker. \par \par Previous testing:\par \par 6/21/2021.  Nuclear stress test.  Completed 7 minutes 30 seconds of Demetrius protocol achieving 95% of MPHR.  No chest pain with exercise.  Significant ST segment changes inferolaterally.  Small mild defect in inferior wall that is reversible consistent with ischemia.  Accuracy of the test was decreased with significant gut attenuation and resolution of defect on prone imaging.  EF: 70%.\par \par EKG 5/10/21: SB at 59 bpm with LVH and nonspecific ST-T wave abnormalities, MD interval 198 ms, QTc 411 ms\par \par Echocardiogram August 2020 reveals EF of 60%,  RVSP of 31 mm of mercury. No evidence of LVH.  \par \par Nuclear stress testing June 14, 2018 revealed equivocal EKG changes, a small mild defect of the anterior wall that is fixed and consistent with breast attenuation artifact and otherwise negative for ischemia. No symptoms were reported with exercise.\par \par Her abdominal aortic ultrasound was unremarkable in August 2020 and the carotid ultrasound in August 2020 showed atherosclerosis without stenosis.  There was left ECA narrowing\par \par labs 1/27/20: K 4.2, Cr 0.72, Ca 10.1, AST 19, ALT 9, Mag 2, Chol 226, HDL 84, , Trigs 97, TSH 5.47, Hgb 14.2, A1C 5.2,

## 2022-10-10 NOTE — PHYSICAL EXAM
[General Appearance - Well Developed] : well developed [Well Groomed] : well groomed [Normal Appearance] : normal appearance [General Appearance - Well Nourished] : well nourished [No Deformities] : no deformities [General Appearance - In No Acute Distress] : no acute distress [Normal Conjunctiva] : the conjunctiva exhibited no abnormalities [Eyelids - No Xanthelasma] : the eyelids demonstrated no xanthelasmas [No Jugular Venous Plaza A Waves] : no jugular venous plaza A waves [Respiration, Rhythm And Depth] : normal respiratory rhythm and effort [Exaggerated Use Of Accessory Muscles For Inspiration] : no accessory muscle use [Auscultation Breath Sounds / Voice Sounds] : lungs were clear to auscultation bilaterally [Heart Rate And Rhythm] : heart rate and rhythm were normal [Heart Sounds] : normal S1 and S2 [Murmurs] : no murmurs present [Edema] : no peripheral edema present [Abdomen Soft] : soft [Abdomen Tenderness] : non-tender [Abdomen Mass (___ Cm)] : no abdominal mass palpated [Abnormal Walk] : normal gait [Gait - Sufficient For Exercise Testing] : the gait was sufficient for exercise testing [Nail Clubbing] : no clubbing of the fingernails [Cyanosis, Localized] : no localized cyanosis [Skin Color & Pigmentation] : normal skin color and pigmentation [] : no rash [No Venous Stasis] : no venous stasis [Skin Lesions] : no skin lesions [No Skin Ulcers] : no skin ulcer [No Xanthoma] : no  xanthoma was observed [Oriented To Time, Place, And Person] : oriented to person, place, and time [Affect] : the affect was normal [Mood] : the mood was normal [No Anxiety] : not feeling anxious

## 2022-10-10 NOTE — ASSESSMENT
[FreeTextEntry1] : MARAL GARCIA is a 82 year old F who presents today with the above history and the following active issues:\par \par HTN: Controlled and labile.  History of whitecoat syndrome.;  Denies any symptoms of orthostasis.  The patient will think about restarting Coreg.  If she does, we will check her blood pressure in 1 week.  She is aware of the risks of uncontrolled hypertension and her history of cardiomyopathy.\par \par CAD: Mild abnormality in inferior wall perfusion on stress test June 2021.  She did not pursue subsequent CTA of coronaries as recommended.  The patient has been asymptomatic with fairly active lifestyle for her age since then on current medications.  He declines any further testing for CAD\par \par She has prior history of Takotsubo cardiomyopathy  after sudden death of her , which has resolved.  LVEF should be followed with echocardiogram.  She does not want to pursue this at the current time.\par \par Hypercholesterolemia.  She  stopped taking Crestor.  LDL went up to 129.  I have asked her to resume Crestor.  She declines patient understands the risks of progressive CAD.  \par \par History of CLL.  Now with normalized WBC .  Follows  Dr. Adams.\par \par History of DVT during pregnancy. No recurrence.\par \par Ongoing f/u with PCP.\par \par \par Thank you for this referral and allowing me to participate in the care of this patient.  If I can be of any further help or  if you have any questions, please do not hesitate to contact me\par \par \par Sincerely,\par \par Terrell Christensen MD, FACC, GUILLERMO

## 2022-10-17 ENCOUNTER — APPOINTMENT (OUTPATIENT)
Dept: CARDIOLOGY | Facility: CLINIC | Age: 82
End: 2022-10-17

## 2022-10-17 VITALS
BODY MASS INDEX: 18.66 KG/M2 | DIASTOLIC BLOOD PRESSURE: 90 MMHG | HEIGHT: 65 IN | TEMPERATURE: 97.1 F | HEART RATE: 61 BPM | SYSTOLIC BLOOD PRESSURE: 164 MMHG | WEIGHT: 112 LBS | OXYGEN SATURATION: 100 %

## 2022-10-17 DIAGNOSIS — I10 ESSENTIAL (PRIMARY) HYPERTENSION: ICD-10-CM

## 2022-10-17 PROCEDURE — 99213 OFFICE O/P EST LOW 20 MIN: CPT

## 2022-10-17 NOTE — REASON FOR VISIT
[Hypertension] : hypertension [FreeTextEntry1] : Mike is a very pleasant 82-year-old female that presents today for reevaluation of her blood pressure.  At last office visit approximately 1 week ago carvedilol 6.25 mg twice daily was started secondary to increased pressures noted on her home device and in the office.  States that since starting the medication she feels little bit better.  History of chest pressure and eyes burning which have improved with beta-blocker therapy.\par \par Brings home readings with consistent readings in the 140s to 170s.  Repeat blood pressure on my evaluation was 164/72 mmHg.\par \par Patient reports being extremely stressed with the passing of her sister and a family member undergoing brain surgery.\par \par Past medical history of Takotsubo cardiomyopathy after sudden death of her  in March 2012, currently normalized LVEF, labile HTN, CLL with current neutropenia, completed Rituxan chemotherapy followed by Dr. Adams, DVT during pregnancy, and GERD.\par

## 2022-10-17 NOTE — ASSESSMENT
[FreeTextEntry1] : To review, Destiny is a very pleasant 82-year-old female seen today for blood pressure reevaluation after starting carvedilol 6.25 mg twice daily.  States that overall she feels better since starting the medication.  Blood pressure remains elevated.\par \par Recommend stepwise increase in her medications as she is relatively medication naïve and is hesitant to increase.  Does not wish to start new medication.  Carvedilol 12.5 mg every morning and 6.25 mg nightly for 2 weeks.  If no signs or symptoms of hypotension or bradycardia, patient will increase to 12.5 mg twice daily and have repeat blood pressure and HR check in 1 month.  \par \par Patient verbalizes understanding and is in agreement with plan.

## 2022-11-15 ENCOUNTER — APPOINTMENT (OUTPATIENT)
Dept: CARDIOLOGY | Facility: CLINIC | Age: 82
End: 2022-11-15

## 2023-03-14 RX ORDER — CARVEDILOL 6.25 MG/1
6.25 TABLET, FILM COATED ORAL
Qty: 90 | Refills: 0 | Status: ACTIVE | COMMUNITY
Start: 2018-05-24 | End: 1900-01-01

## 2024-02-28 ENCOUNTER — RX CHANGE (OUTPATIENT)
Age: 84
End: 2024-02-28